# Patient Record
Sex: FEMALE | Race: WHITE
[De-identification: names, ages, dates, MRNs, and addresses within clinical notes are randomized per-mention and may not be internally consistent; named-entity substitution may affect disease eponyms.]

---

## 2017-08-03 ENCOUNTER — HOSPITAL ENCOUNTER (EMERGENCY)
Dept: HOSPITAL 62 - ER | Age: 57
Discharge: LEFT BEFORE DISCHARGE | End: 2017-08-03

## 2017-08-03 DIAGNOSIS — J39.1: ICD-10-CM

## 2017-08-03 DIAGNOSIS — Z53.9: Primary | ICD-10-CM

## 2019-12-23 ENCOUNTER — HOSPITAL ENCOUNTER (OUTPATIENT)
Dept: HOSPITAL 62 - RAD | Age: 59
End: 2019-12-23
Attending: INTERNAL MEDICINE
Payer: COMMERCIAL

## 2019-12-23 DIAGNOSIS — N18.3: ICD-10-CM

## 2019-12-23 DIAGNOSIS — I12.9: Primary | ICD-10-CM

## 2019-12-23 PROCEDURE — 93975 VASCULAR STUDY: CPT

## 2019-12-23 NOTE — RADIOLOGY REPORT (SQ)
EXAM DESCRIPTION:  DUPLEX ART/SUHA FLOW COMPLETE



COMPLETED DATE/TIME:  12/23/2019 8:27 am



REASON FOR STUDY:  N18.3 CHRONIC KIDNEY DISEASE, STAGE 3 (MODERATE) N18.3  CHRONIC KIDNEY DISEASE, ST
AGE 3 (MODERATE) I12.9  HYPERTENSIVE CHRONIC KIDNEY DISEASE W STG 1-4/UNSP CHR



COMPARISON:  None.



TECHNIQUE:  Realtime and static grayscale images acquired. Selected color Doppler, velocities and spe
ctral images recorded.



LIMITATIONS:  Body habitus, midline bowel gas, unable to visualize the renal artery origins off the a
kendrick



FINDINGS:  RIGHT KIDNEY:

RENAL ARTERY VELOCITIES: At the hilum, 81 cm/sec.  Segmental artery velocity 53 cm/sec.

RENAL VEIN:  Color doppler flow present, patent.

VELOCITY RATIO: Normal.  Grossly normal renal artery spectral waveforms

KIDNEY:  Normal size.   No significant pathology.

LEFT KIDNEY:

RENAL ARTERY VELOCITIES: At the hilum, 152 cm/sec.  Segmental artery velocity 41 cm/sec.

RENAL VEIN:  Color doppler flow present, patent.

VELOCITY RATIO: Normal. Normal waveforms.

KIDNEY:  Normal size.   No significant pathology.

BLADDER: Not well seen

OTHER: No other significant finding.



IMPRESSION:  NO GROSS DOPPLER EVIDENCE OF HEMODYNAMICALLY SIGNIFICANT RENAL ARTERY STENOSIS.



COMMENT:  NORMAL RENAL ARTERY/AORTA VELOCITY RATIO IS LESS THAN OR EQUAL TO 3.5.



TECHNICAL DOCUMENTATION:  JOB ID:  [id number]

 2011 [employer]- All Rights Reserved



Reading location - IP/workstation name: [email or web address]